# Patient Record
Sex: FEMALE | Race: OTHER | NOT HISPANIC OR LATINO
[De-identification: names, ages, dates, MRNs, and addresses within clinical notes are randomized per-mention and may not be internally consistent; named-entity substitution may affect disease eponyms.]

---

## 2018-03-09 PROBLEM — Z00.00 ENCOUNTER FOR PREVENTIVE HEALTH EXAMINATION: Status: ACTIVE | Noted: 2018-03-09

## 2018-03-12 ENCOUNTER — APPOINTMENT (OUTPATIENT)
Dept: OTOLARYNGOLOGY | Facility: CLINIC | Age: 68
End: 2018-03-12
Payer: MEDICARE

## 2018-03-12 VITALS
SYSTOLIC BLOOD PRESSURE: 155 MMHG | HEART RATE: 76 BPM | WEIGHT: 180 LBS | BODY MASS INDEX: 31.89 KG/M2 | TEMPERATURE: 98.9 F | DIASTOLIC BLOOD PRESSURE: 76 MMHG | HEIGHT: 63 IN

## 2018-03-12 DIAGNOSIS — D44.2 NEOPLASM OF UNCERTAIN BEHAVIOR OF PARATHYROID GLAND: ICD-10-CM

## 2018-03-12 PROCEDURE — 31575 DIAGNOSTIC LARYNGOSCOPY: CPT

## 2018-03-12 PROCEDURE — 99204 OFFICE O/P NEW MOD 45 MIN: CPT | Mod: 25

## 2018-03-12 RX ORDER — ACETAMINOPHEN 500 MG/1
500 TABLET, COATED ORAL
Refills: 0 | Status: ACTIVE | COMMUNITY

## 2018-04-30 VITALS
WEIGHT: 183.87 LBS | SYSTOLIC BLOOD PRESSURE: 187 MMHG | RESPIRATION RATE: 18 BRPM | OXYGEN SATURATION: 99 % | DIASTOLIC BLOOD PRESSURE: 75 MMHG | TEMPERATURE: 96 F | HEIGHT: 63 IN | HEART RATE: 68 BPM

## 2018-04-30 NOTE — PATIENT PROFILE ADULT. - PSH
History of abdominal hysterectomy  2015  History of cholecystectomy  12 yrs. ago  History of foot surgery  ankle left 9 yrs. ago

## 2018-05-01 ENCOUNTER — INPATIENT (INPATIENT)
Facility: HOSPITAL | Age: 68
LOS: 0 days | Discharge: HOME | DRG: 627 | End: 2018-05-02
Attending: SPECIALIST | Admitting: SPECIALIST
Payer: COMMERCIAL

## 2018-05-01 ENCOUNTER — RESULT REVIEW (OUTPATIENT)
Age: 68
End: 2018-05-01

## 2018-05-01 ENCOUNTER — APPOINTMENT (OUTPATIENT)
Dept: OTOLARYNGOLOGY | Facility: HOSPITAL | Age: 68
End: 2018-05-01

## 2018-05-01 DIAGNOSIS — Z90.49 ACQUIRED ABSENCE OF OTHER SPECIFIED PARTS OF DIGESTIVE TRACT: Chronic | ICD-10-CM

## 2018-05-01 DIAGNOSIS — Z98.890 OTHER SPECIFIED POSTPROCEDURAL STATES: Chronic | ICD-10-CM

## 2018-05-01 DIAGNOSIS — Z90.710 ACQUIRED ABSENCE OF BOTH CERVIX AND UTERUS: Chronic | ICD-10-CM

## 2018-05-01 LAB
CALCIUM SERPL-MCNC: 9.8 MG/DL — SIGNIFICANT CHANGE UP (ref 8.4–10.5)
PTH-INTACT IO % DIF SERPL: 129.5 PG/ML — HIGH (ref 8.5–72.5)
PTH-INTACT IO % DIF SERPL: 144.3 PG/ML — HIGH (ref 8.5–72.5)
PTH-INTACT IO % DIF SERPL: 179.6 PG/ML — HIGH (ref 8.5–72.5)
PTH-INTACT IO % DIF SERPL: 34.9 PG/ML — SIGNIFICANT CHANGE UP (ref 8.5–72.5)
PTH-INTACT IO % DIF SERPL: 53 PG/ML — SIGNIFICANT CHANGE UP (ref 8.5–72.5)

## 2018-05-01 PROCEDURE — 60500 EXPLORE PARATHYROID GLANDS: CPT

## 2018-05-01 RX ORDER — CEFAZOLIN SODIUM 1 G
1000 VIAL (EA) INJECTION EVERY 8 HOURS
Qty: 0 | Refills: 0 | Status: COMPLETED | OUTPATIENT
Start: 2018-05-01 | End: 2018-05-02

## 2018-05-01 RX ORDER — ACETAMINOPHEN 500 MG
0 TABLET ORAL
Qty: 0 | Refills: 0 | COMMUNITY

## 2018-05-01 RX ORDER — MORPHINE SULFATE 50 MG/1
2 CAPSULE, EXTENDED RELEASE ORAL EVERY 4 HOURS
Qty: 0 | Refills: 0 | Status: DISCONTINUED | OUTPATIENT
Start: 2018-05-01 | End: 2018-05-02

## 2018-05-01 RX ORDER — OXYCODONE HYDROCHLORIDE 5 MG/1
5 TABLET ORAL EVERY 6 HOURS
Qty: 0 | Refills: 0 | Status: DISCONTINUED | OUTPATIENT
Start: 2018-05-01 | End: 2018-05-02

## 2018-05-01 RX ORDER — ACETAMINOPHEN 500 MG
650 TABLET ORAL EVERY 6 HOURS
Qty: 0 | Refills: 0 | Status: DISCONTINUED | OUTPATIENT
Start: 2018-05-01 | End: 2018-05-02

## 2018-05-01 RX ORDER — ONDANSETRON 8 MG/1
4 TABLET, FILM COATED ORAL EVERY 6 HOURS
Qty: 0 | Refills: 0 | Status: DISCONTINUED | OUTPATIENT
Start: 2018-05-01 | End: 2018-05-02

## 2018-05-01 RX ORDER — LABETALOL HCL 100 MG
5 TABLET ORAL EVERY 6 HOURS
Qty: 0 | Refills: 0 | Status: DISCONTINUED | OUTPATIENT
Start: 2018-05-01 | End: 2018-05-02

## 2018-05-01 RX ADMIN — MORPHINE SULFATE 2 MILLIGRAM(S): 50 CAPSULE, EXTENDED RELEASE ORAL at 13:51

## 2018-05-01 RX ADMIN — Medication 650 MILLIGRAM(S): at 21:33

## 2018-05-01 RX ADMIN — Medication 100 MILLIGRAM(S): at 23:58

## 2018-05-01 RX ADMIN — Medication 100 MILLIGRAM(S): at 16:16

## 2018-05-01 RX ADMIN — MORPHINE SULFATE 2 MILLIGRAM(S): 50 CAPSULE, EXTENDED RELEASE ORAL at 15:46

## 2018-05-01 RX ADMIN — Medication 650 MILLIGRAM(S): at 20:32

## 2018-05-01 NOTE — H&P ADULT - HISTORY OF PRESENT ILLNESS
Mr. Shagufta Sanchez is a 67 year-old female with history of primary hyperparathyroidism.  Hypercalcemia was found incidentally.  A sestamibi scan was consistent with left upper adenoma.  She does not have a history of kidney stones or psychiatric problems.  The complete and comprehensive head and neck exam was unremarkable. Fiberoptic exam of the pharynx and larynx were unremarkable.  A long discussion was had with the patient.  Discussion of management options for primary hyperparathyroidism was had, including surgery or no treatment.  Neck exploration focused on removal of likely left upper parathyroid adenoma was explained and recommended, as well as the possibility for bilateral neck exploration and removal of additional parathyroid tissue if intraoperative parathyroid hormone assays do not decrease appropriately. The procedure was discussed in detail as well as the possible complications which include but are not limited to bleeding, infection, one-sided or two-sided vocal cord paralysis, swallowing or airway problems, cosmetic deformity, hypocalcemia, persistent or recurrent hypercalcemia, and need for revision surgery.   The patient would like to proceed with parathyroidectomy.

## 2018-05-01 NOTE — H&P ADULT - ASSESSMENT
A/P: 67F s/p parathyroidectomy for parathyroid adenoma  - Pain control  - Ca draws at 5pm and tomorrow AM  - Home meds  - Regular diet  - SCDs  - monitor JPs outputs  - Admit to ENT under Dr. Mario  - Call/page E[facility] for questions/concerns

## 2018-05-02 ENCOUNTER — TRANSCRIPTION ENCOUNTER (OUTPATIENT)
Age: 68
End: 2018-05-02

## 2018-05-02 VITALS
OXYGEN SATURATION: 98 % | DIASTOLIC BLOOD PRESSURE: 50 MMHG | RESPIRATION RATE: 18 BRPM | SYSTOLIC BLOOD PRESSURE: 116 MMHG | HEART RATE: 79 BPM | TEMPERATURE: 98 F

## 2018-05-02 LAB
CALCIUM SERPL-MCNC: 8.9 MG/DL — SIGNIFICANT CHANGE UP (ref 8.4–10.5)
SURGICAL PATHOLOGY STUDY: SIGNIFICANT CHANGE UP

## 2018-05-02 PROCEDURE — 60500 EXPLORE PARATHYROID GLANDS: CPT

## 2018-05-02 RX ORDER — ACETAMINOPHEN 500 MG
2 TABLET ORAL
Qty: 0 | Refills: 0 | COMMUNITY
Start: 2018-05-02

## 2018-05-02 RX ADMIN — Medication 650 MILLIGRAM(S): at 08:03

## 2018-05-02 RX ADMIN — Medication 650 MILLIGRAM(S): at 07:03

## 2018-05-02 RX ADMIN — Medication 100 MILLIGRAM(S): at 07:04

## 2018-05-02 NOTE — DISCHARGE NOTE ADULT - ADDITIONAL INSTRUCTIONS
Follow up with Dr. Mario on Tuesday 5/8. If you experience fevers, neck swelling, difficulty swallowing, difficulty breathing, or drainage from your incision call your doctor and go to the ER. If you feel numbness and tingling in your fingertips or around your lips this could be a sign of hypocalcemia, take 2 TUMS and call your doctor.

## 2018-05-02 NOTE — DISCHARGE NOTE ADULT - PLAN OF CARE
Treatment of hyperparathyroidism s/p excision of parathyroid adenoma. Plan for discharge home and follow up next week.

## 2018-05-02 NOTE — DISCHARGE NOTE ADULT - INSTRUCTIONS
Diet: can resume a regular diet   Activity: no strenuous activity or heavy lifting (>5-10 lbs) until your follow up appointment. Walking is encouraged. You may shower, but do not scrub the area around your incision, pat dry gently.

## 2018-05-02 NOTE — DISCHARGE NOTE ADULT - MEDICATION SUMMARY - MEDICATIONS TO TAKE
I will START or STAY ON the medications listed below when I get home from the hospital:    acetaminophen 325 mg oral tablet  -- 2 tab(s) by mouth every 6 hours, As needed, Mild Pain (1 - 3)  -- Indication: For D44.2 E21.3

## 2018-05-02 NOTE — DISCHARGE NOTE ADULT - CARE PLAN
Principal Discharge DX:	Hyperparathyroidism  Goal:	Treatment of hyperparathyroidism  Assessment and plan of treatment:	s/p excision of parathyroid adenoma. Plan for discharge home and follow up next week.

## 2018-05-02 NOTE — DISCHARGE NOTE ADULT - PATIENT PORTAL LINK FT
You can access the [facility], offered by [facility], by registering with the following website: [email or web address]

## 2018-05-02 NOTE — DISCHARGE NOTE ADULT - HOSPITAL COURSE
67F with h/o primary hyperparathyroidism. Hypercalcemia was noted incidentally and the patient had a sestamibi scan which was consistent with a left upper adenoma. She underwent excision of left parathyroid adenoma in the OR on 5/1. There were no intra-operative complications and the patient was extubated in the OR and taken to the PACU. She had no issues overnight. Pain was well controlled with Tylenol. She had a drain placed during surgery that had sufficiently low output and was removed prior to discharge. She was observed for 2 hours following drain removal and her neck remained flat and soft.

## 2018-05-02 NOTE — DISCHARGE NOTE ADULT - CARE PROVIDER_API CALL
Mayco Oliva), [facility]; Otolaryngology  [address]  [address] [place] [address]  Phone: [phone]  Fax: [phone]

## 2018-05-03 PROCEDURE — 60500 EXPLORE PARATHYROID GLANDS: CPT

## 2018-05-03 PROCEDURE — 88331 PATH CONSLTJ SURG 1 BLK 1SPC: CPT

## 2018-05-03 PROCEDURE — 88305 TISSUE EXAM BY PATHOLOGIST: CPT

## 2018-05-03 PROCEDURE — 36415 COLL VENOUS BLD VENIPUNCTURE: CPT

## 2018-05-03 PROCEDURE — 86850 RBC ANTIBODY SCREEN: CPT

## 2018-05-03 PROCEDURE — 86901 BLOOD TYPING SEROLOGIC RH(D): CPT

## 2018-05-03 PROCEDURE — 86900 BLOOD TYPING SEROLOGIC ABO: CPT

## 2018-05-03 PROCEDURE — 82310 ASSAY OF CALCIUM: CPT

## 2018-05-03 PROCEDURE — 83970 ASSAY OF PARATHORMONE: CPT

## 2018-05-04 DIAGNOSIS — E83.52 HYPERCALCEMIA: ICD-10-CM

## 2018-05-04 DIAGNOSIS — E21.0 PRIMARY HYPERPARATHYROIDISM: ICD-10-CM

## 2018-05-04 DIAGNOSIS — D35.1 BENIGN NEOPLASM OF PARATHYROID GLAND: ICD-10-CM

## 2018-05-04 DIAGNOSIS — I10 ESSENTIAL (PRIMARY) HYPERTENSION: ICD-10-CM

## 2018-05-04 DIAGNOSIS — E66.9 OBESITY, UNSPECIFIED: ICD-10-CM

## 2018-05-08 ENCOUNTER — APPOINTMENT (OUTPATIENT)
Dept: OTOLARYNGOLOGY | Facility: CLINIC | Age: 68
End: 2018-05-08
Payer: MEDICARE

## 2018-05-08 ENCOUNTER — LABORATORY RESULT (OUTPATIENT)
Age: 68
End: 2018-05-08

## 2018-05-08 DIAGNOSIS — R13.10 DYSPHAGIA, UNSPECIFIED: ICD-10-CM

## 2018-05-08 PROCEDURE — 31575 DIAGNOSTIC LARYNGOSCOPY: CPT | Mod: 58

## 2018-09-17 ENCOUNTER — APPOINTMENT (OUTPATIENT)
Dept: OTOLARYNGOLOGY | Facility: CLINIC | Age: 68
End: 2018-09-17
Payer: MEDICARE

## 2018-09-17 DIAGNOSIS — E21.3 HYPERPARATHYROIDISM, UNSPECIFIED: ICD-10-CM

## 2018-09-17 DIAGNOSIS — D35.1 BENIGN NEOPLASM OF PARATHYROID GLAND: ICD-10-CM

## 2018-09-17 PROCEDURE — 99214 OFFICE O/P EST MOD 30 MIN: CPT | Mod: 25

## 2018-09-17 PROCEDURE — 31575 DIAGNOSTIC LARYNGOSCOPY: CPT

## 2019-12-26 NOTE — H&P ADULT - NSHPSOURCEINFORD_GEN_ALL_CORE
Chart(s)/Patient Received INR on 12/24 - 3.6 (12/21). Unsuccessful reaching 12/23-24. Noted pt was ultimately admitted to [place] 12/24 for chest discomfort concerning for NSTEMI. Presently admitted. Defer warfarin management to inpatient team while admitted.